# Patient Record
Sex: FEMALE | Race: AMERICAN INDIAN OR ALASKA NATIVE | Employment: UNEMPLOYED | URBAN - NONMETROPOLITAN AREA
[De-identification: names, ages, dates, MRNs, and addresses within clinical notes are randomized per-mention and may not be internally consistent; named-entity substitution may affect disease eponyms.]

---

## 2018-02-19 ENCOUNTER — HOSPITAL ENCOUNTER (EMERGENCY)
Facility: HOSPITAL | Age: 18
Discharge: HOME | End: 2018-02-19
Attending: NURSE PRACTITIONER | Admitting: NURSE PRACTITIONER
Payer: MEDICAID

## 2018-02-19 VITALS
TEMPERATURE: 97.9 F | SYSTOLIC BLOOD PRESSURE: 176 MMHG | RESPIRATION RATE: 16 BRPM | OXYGEN SATURATION: 99 % | HEART RATE: 84 BPM | DIASTOLIC BLOOD PRESSURE: 100 MMHG

## 2018-02-19 DIAGNOSIS — H65.92 LEFT NONSUPPURATIVE OTITIS MEDIA: ICD-10-CM

## 2018-02-19 PROCEDURE — 99213 OFFICE O/P EST LOW 20 MIN: CPT | Performed by: NURSE PRACTITIONER

## 2018-02-19 PROCEDURE — G0463 HOSPITAL OUTPT CLINIC VISIT: HCPCS

## 2018-02-19 RX ORDER — AMOXICILLIN 875 MG
875 TABLET ORAL 2 TIMES DAILY
Qty: 20 TABLET | Refills: 0 | Status: SHIPPED | OUTPATIENT
Start: 2018-02-19 | End: 2018-03-01

## 2018-02-19 ASSESSMENT — ENCOUNTER SYMPTOMS
DYSURIA: 0
CHILLS: 0
VOMITING: 0
FEVER: 0
ABDOMINAL PAIN: 0
ACTIVITY CHANGE: 0
COUGH: 0
WEAKNESS: 0
NAUSEA: 0
APPETITE CHANGE: 0
TROUBLE SWALLOWING: 0
PSYCHIATRIC NEGATIVE: 1
DIARRHEA: 0

## 2018-02-19 NOTE — ED AVS SNAPSHOT
[facility]    [address]    [place] [address]    Phone:  [phone]                                       Heena Del Toro   MRN: [id number]    Department:  [facility]   Date of Visit:  2/19/2018           Patient Information     Date Of Birth          2000        Your diagnoses for this visit were:     Left nonsuppurative otitis media        You were seen by Carmelita Meadows, NP.      Follow-up Information     Follow up with Louis Nich[facility]    Specialty:  Family Practice    Why:  As needed, If symptoms worsen    Contact information:    [facility]  [address]  [place] [address]  [phone]          Follow up with [facility].    Specialty:  EMERGENCY MEDICINE    Why:  As needed, If symptoms worsen    Contact information:    [address]  [place] [address] [phone]    Additional information:    From [place]: Take [address]. Turn left at [address]. Turn left at the first stoplight on [address]. At the first stop sign, take a right onto [address]. Take a left into the parking lot and continue through until you reach the North enterance of the building.       From [place]: Take [address]. Take the [address] ramp towards [place]. Turn left onto [address]. Take a slight right onto [address]. Turn left at the first stoplight on [address]. At the first stop sign, take a right onto [address]. Take a left into the parking lot and continue through until you reach the North enterance of the building.       From [place]: Take [address]. Take a right at [address]. At the first stop sign, take a right onto [address]. Take a left into the parking lot and continue through until you reach the North enterance of the building.         Discharge Instructions       Take antibiotics as ordered.   Eat a yogurt daily while taking  antibiotics.   Take tylenol and/or ibuprofen for pain. Follow dosing on package.   You can take Zyrtec daily for 10 days as this will help the fluid in your ears. Over the counter.   Follow up with PCP with any increase in symptoms or concerns.   Return to urgent care or emergency department with any increase in symptoms or concerns.       Discharge References/Attachments     OTITIS MEDIA (MIDDLE-EAR INFECTION) IN ADULTS (ENGLISH)         Review of your medicines      START taking        Dose / Directions Last dose taken    amoxicillin 875 MG tablet   Commonly known as:  AMOXIL   Dose:  875 mg   Quantity:  20 tablet        Take 1 tablet (875 mg) by mouth 2 times daily for 10 days   Refills:  0          Our records show that you are taking the medicines listed below. If these are incorrect, please call your family doctor or clinic.        Dose / Directions Last dose taken    SYNTHROID PO        Refills:  0                Prescriptions were sent or printed at these locations (1 Prescription)                   [facility]  [address]   [address] [place] [address]    Telephone:  [phone]   Fax:  [phone]   Hours:                  E-Prescribed (1 of 1)         amoxicillin (AMOXIL) 875 MG tablet                Orders Needing Specimen Collection     None      Pending Results     No orders found from 2/17/2018 to 2/20/2018.            Pending Culture Results     No orders found from 2/17/2018 to 2/20/2018.            Thank you for choosing [facility]       Thank you for choosing [facility] for your care. Our goal is always to provide you with excellent care. Hearing back from our patients is one way we can continue to improve our services. Please take a few minutes to complete the written survey that you may receive in the mail after you visit with us. Thank you!        [facility] Information     [facility] lets you send messages to your doctor, view your test results, renew your  "prescriptions, schedule appointments and more. To sign up, go to [email or web address] . Click on \"Log in\" on the left side of the screen, which will take you to the Welcome page. Then click on \"Sign up Now\" on the right side of the page.     You will be asked to enter the access code listed below, as well as some personal information. Please follow the directions to create your username and password.     Your access code is: [email or web address]  Expires: 2018  2:36 PM     Your access code will  in 90 days. If you need help or a new code, please call your [facility] clinic or [phone].        Care EveryWhere ID     This is your Care EveryWhere ID. This could be used by other organizations to access your [facility] medical records  [id number]        Equal Access to Services     KATHERYN ZARAGOZA : Susan Webb, eden morfin, beth handley, alejandro nguyen . So [facility] [phone].    ATENCIÓN: Si habla español, tiene a bowie disposición servicios gratuitos de asistencia lingüística. Llame al [phone].    We comply with applicable federal civil rights laws and [place] laws. We do not discriminate on the basis of race, color, national origin, age, disability, sex, sexual orientation, or gender identity.            After Visit Summary       This is your record. Keep this with you and show to your community pharmacist(s) and doctor(s) at your next visit.                  "

## 2018-02-19 NOTE — ED PROVIDER NOTES
History     Chief Complaint   Patient presents with     Otalgia     Left ear pain starting today. Denies fevers. 2/10 pain.      The history is provided by the patient. No  was used.     Heena Del Toro is a 17 year old female who presents with left ear pain that started today. No interventions for symptoms. Denies fever, chills, or night sweats. Eating and drinking well. Bowel and bladder are working well. No antibiotic use in the past 30 days.       Problem List:    Patient Active Problem List    Diagnosis Date Noted     Dysmenorrhea 03/14/2014     Priority: Medium        Past Medical History:    History reviewed. No pertinent past medical history.    Past Surgical History:    Past Surgical History:   Procedure Laterality Date     LAPAROSCOPIC CHOLECYSTECTOMY  3/24/11     PE TUBES         Family History:    Family History   Problem Relation Age of Onset     DIABETES Maternal Grandmother      Hypertension Maternal Grandmother        Social History:  Marital Status:  Single [1]  Social History   Substance Use Topics     Smoking status: Never Smoker     Smokeless tobacco: Never Used     Alcohol use No        Medications:      Levothyroxine Sodium (SYNTHROID PO)   amoxicillin (AMOXIL) 875 MG tablet         Review of Systems   Constitutional: Negative for activity change, appetite change, chills and fever.   HENT: Positive for ear pain. Negative for congestion, ear discharge, hearing loss and trouble swallowing.         Left ear pain.    Respiratory: Negative for cough.    Gastrointestinal: Negative for abdominal pain, diarrhea, nausea and vomiting.   Genitourinary: Negative for dysuria.   Skin: Negative for rash.   Neurological: Negative for weakness.   Psychiatric/Behavioral: Negative.        Physical Exam   BP: 176/100  Pulse: 84  Temp: 97.9  F (36.6  C)  Resp: 16  SpO2: 99 %      Physical Exam   Constitutional: She is oriented to person, place, and time. She appears well-developed and  well-nourished. No distress.   HENT:   Head: Normocephalic.   Right Ear: External ear normal.   Mouth/Throat: Oropharynx is clear and moist. No oropharyngeal exudate.   Left middle ear with erythema and slight bulge to TM. Bilateral TM's intact. Fluid in left ear.    Neck: Normal range of motion. Neck supple.   Cardiovascular: Normal rate, regular rhythm and normal heart sounds.    No murmur heard.  Pulmonary/Chest: Effort normal. No respiratory distress. She has no wheezes. She has no rales.   Abdominal: Soft. She exhibits no distension.   Musculoskeletal: Normal range of motion.   Lymphadenopathy:     She has no cervical adenopathy.   Neurological: She is alert and oriented to person, place, and time. She exhibits normal muscle tone.   Skin: Skin is warm and dry. No rash noted. She is not diaphoretic.   Psychiatric: She has a normal mood and affect. Her behavior is normal.   Nursing note and vitals reviewed.      ED Course     ED Course     Procedures      Assessments & Plan (with Medical Decision Making)     Discussed plan of care. She verbalized understanding. All questions answered.     I have reviewed the nursing notes.    I have reviewed the findings, diagnosis, plan and need for follow up with the patient.  Discharged in stable condition.      New Prescriptions    AMOXICILLIN (AMOXIL) 875 MG TABLET    Take 1 tablet (875 mg) by mouth 2 times daily for 10 days       Final diagnoses:   Left nonsuppurative otitis media     Take antibiotics as ordered.   Eat a yogurt daily while taking antibiotics.   Take tylenol and/or ibuprofen for pain. Follow dosing on package.   You can take Zyrtec daily for 10 days as this will help the fluid in your ears. Over the counter.   Follow up with PCP with any increase in symptoms or concerns.   Return to urgent care or emergency department with any increase in symptoms or concerns.     VIDHI Núñez  2/19/2018  2:11 PM  [facility]       Carmelita Meadows,  NP  02/19/18 1704

## 2018-02-19 NOTE — ED AVS SNAPSHOT
[facility]    [address] [address]    Phone:  [phone]                                       Heena Del Toro   MRN: [id number]    Department:  [facility]   Date of Visit:  2/19/2018           After Visit Summary Signature Page     I have received my discharge instructions, and my questions have been answered. I have discussed any challenges I see with this plan with the nurse or doctor.    ..........................................................................................................................................  Patient/Patient Representative Signature      ..........................................................................................................................................  Patient Representative Print Name and Relationship to Patient    ..................................................               ................................................  Date                                            Time    ..........................................................................................................................................  Reviewed by Signature/Title    ...................................................              ..............................................  Date                                                            Time

## 2018-02-19 NOTE — DISCHARGE INSTRUCTIONS
Take antibiotics as ordered.   Eat a yogurt daily while taking antibiotics.   Take tylenol and/or ibuprofen for pain. Follow dosing on package.   You can take Zyrtec daily for 10 days as this will help the fluid in your ears. Over the counter.   Follow up with PCP with any increase in symptoms or concerns.   Return to urgent care or emergency department with any increase in symptoms or concerns.

## 2025-04-16 ENCOUNTER — APPOINTMENT (OUTPATIENT)
Dept: GENERAL RADIOLOGY | Facility: HOSPITAL | Age: 25
End: 2025-04-16
Attending: FAMILY MEDICINE
Payer: COMMERCIAL

## 2025-04-16 ENCOUNTER — HOSPITAL ENCOUNTER (EMERGENCY)
Facility: HOSPITAL | Age: 25
Discharge: HOME | End: 2025-04-16
Attending: FAMILY MEDICINE
Payer: COMMERCIAL

## 2025-04-16 VITALS
SYSTOLIC BLOOD PRESSURE: 131 MMHG | TEMPERATURE: 98.1 F | DIASTOLIC BLOOD PRESSURE: 95 MMHG | WEIGHT: 243.5 LBS | OXYGEN SATURATION: 98 % | HEART RATE: 108 BPM | RESPIRATION RATE: 16 BRPM

## 2025-04-16 DIAGNOSIS — K59.04 CHRONIC IDIOPATHIC CONSTIPATION: ICD-10-CM

## 2025-04-16 DIAGNOSIS — R10.9 CHRONIC ABDOMINAL PAIN: ICD-10-CM

## 2025-04-16 DIAGNOSIS — G89.29 CHRONIC ABDOMINAL PAIN: ICD-10-CM

## 2025-04-16 LAB
HCG UR QL: NEGATIVE
HOLD SPECIMEN: NORMAL

## 2025-04-16 PROCEDURE — 74019 RADEX ABDOMEN 2 VIEWS: CPT

## 2025-04-16 PROCEDURE — 74019 RADEX ABDOMEN 2 VIEWS: CPT | Mod: 26 | Performed by: RADIOLOGY

## 2025-04-16 PROCEDURE — 81025 URINE PREGNANCY TEST: CPT | Performed by: FAMILY MEDICINE

## 2025-04-16 PROCEDURE — 99284 EMERGENCY DEPT VISIT MOD MDM: CPT

## 2025-04-16 PROCEDURE — 99283 EMERGENCY DEPT VISIT LOW MDM: CPT | Performed by: FAMILY MEDICINE

## 2025-04-16 RX ORDER — POLYETHYLENE GLYCOL 3350 17 G/17G
1 POWDER, FOR SOLUTION ORAL DAILY
COMMUNITY

## 2025-04-16 RX ORDER — OMEPRAZOLE 40 MG/1
40 CAPSULE, DELAYED RELEASE ORAL
COMMUNITY
Start: 2025-03-10

## 2025-04-16 RX ORDER — SENNOSIDES A AND B 8.6 MG/1
1 TABLET, FILM COATED ORAL EVERY EVENING
COMMUNITY
Start: 2025-02-03

## 2025-04-16 ASSESSMENT — COLUMBIA-SUICIDE SEVERITY RATING SCALE - C-SSRS
1. IN THE PAST MONTH, HAVE YOU WISHED YOU WERE DEAD OR WISHED YOU COULD GO TO SLEEP AND NOT WAKE UP?: NO
6. HAVE YOU EVER DONE ANYTHING, STARTED TO DO ANYTHING, OR PREPARED TO DO ANYTHING TO END YOUR LIFE?: NO
2. HAVE YOU ACTUALLY HAD ANY THOUGHTS OF KILLING YOURSELF IN THE PAST MONTH?: NO

## 2025-04-16 ASSESSMENT — ACTIVITIES OF DAILY LIVING (ADL)
ADLS_ACUITY_SCORE: 41

## 2025-04-16 NOTE — ED TRIAGE NOTES
Pt presents with c/o bad pain and constipation that has been chronic for about a year. Pt states she takes senna and miralax with no relief.

## 2025-04-16 NOTE — ED PROVIDER NOTES
History     Chief Complaint   Patient presents with    Abdominal Pain    Constipation     HPI  Heena Del Toro is a 24 year old female who comes in with her mom in private car with complaint of intermittent abdominal pain and continuing/recurrent constipation/obstipation.  Patient states that she had a bowel movement yesterday but it was small and consisted of hard stool mera.  She feels like she is not getting cleaned out.  She also describes intermittent abdominal pain that is cramping in nature.  Denies vomiting.  She states her appetite is decreased.    Denies diarrhea denies fevers chills or sweats.  Past history is notable for status post lap cholecystectomy, also history of levothyroxine.    Allergies:  No Known Allergies    Problem List:    Patient Active Problem List    Diagnosis Date Noted    Dysmenorrhea 03/14/2014     Priority: Medium        Past Medical History:    No past medical history on file.    Past Surgical History:    Past Surgical History:   Procedure Laterality Date    LAPAROSCOPIC CHOLECYSTECTOMY  3/24/11    PE TUBES         Family History:    Family History   Problem Relation Age of Onset    Diabetes Maternal Grandmother     Hypertension Maternal Grandmother        Social History:  Marital Status:  Single [1]  Social History     Tobacco Use    Smoking status: Never    Smokeless tobacco: Never   Substance Use Topics    Alcohol use: No    Drug use: No        Medications:    Levothyroxine Sodium (SYNTHROID PO)  methylcellulose (CITRUCEL) 500 MG TABS tablet  omeprazole (PRILOSEC) 40 MG DR capsule  polyethylene glycol (MIRALAX) 17 GM/Dose powder  senna (SENOKOT) 8.6 MG tablet          Review of Systems    Physical Exam   BP: (!) 149/87  Pulse: 119  Temp: 99.3  F (37.4  C)  Resp: 16  Weight: 110.5 kg (243 lb 8 oz)  SpO2: 98 %      Physical Exam    ED Course        Procedures           Results for orders placed or performed during the hospital encounter of 04/16/25 (from the past 24 hours)    [facility] Draw    Narrative    The following orders were created for panel order [facility].  Procedure                               Abnormality         Status                     ---------                               -----------         ------                     Extra Urine Collection[[id number]]                          Final result                 Please view results for these tests on the individual orders.   Extra Urine Collection   Result Value Ref Range    Hold Specimen JIC    HCG qualitative urine   Result Value Ref Range    hCG Urine Qualitative Negative Negative   XR Abdomen 2 Views    Narrative    PROCEDURE:  XR ABDOMEN 2 VIEWS    HISTORY:  abd pain in context of chronic constipation, rule out bowel  obstruction.     TECHNIQUE:  Upright and supine views of the abdomen were obtained.    COMPARISON:  8/16/2013    FINDINGS:     There is a nonobstructive bowel gas pattern. No free air is seen. No  abnormal calcifications are evident. There are surgical clips in the  right upper quadrant.      Impression    IMPRESSION:    No obstruction or free air.    ERMA COWAN MD         SYSTEM ID:  [id number]       Medications - No data to display    Assessments & Plan (with Medical Decision Making)     I have reviewed the nursing notes.    I I spent 15 to 20 minutes discussing with the patient and her mom the chronic and recurrent nature of her condition.  I recommended increasing her MiraLAX to hopefully facilitate getting her cleaned out and then resuming her regular regiment.  We discovered that [facility] gastroenterology had contacted her to set up appointment on 3/10/2025 but they did not call back to set this appointment up.  Over the course of the ER visit they did contact GI and have no scheduled appointment there which was the original intent of their primary care provider.  Symptoms are suspicious for a relapsing remitting type of irritable bowel syndrome.  GI consultation can clarify  this    Discharge Medication List as of 4/16/2025  2:55 PM          Final diagnoses:   Chronic idiopathic constipation   Chronic abdominal pain       4/16/2025   [facility]       Juanjose Mattson MD  04/16/25 [id number]

## 2025-04-16 NOTE — DISCHARGE INSTRUCTIONS
Please consider taking additional doses of MiraLAX as we discussed to clean out your system and then resume your regular bowel regimen after you are cleaned out.  Follow-up with gastroenterology department at [facility] as we discussed.  Return here or see primary MD if symptoms are worsening.  Eat a high-fiber high liquid diet with 4-5 servings of fruits and vegetables daily to help alleviate your constipation.

## 2025-04-16 NOTE — ED NOTES
"ED Triage Provider Note  [facility]  Encounter Date: Apr 16, 2025    History:  Chief Complaint   Patient presents with    Abdominal Pain    Constipation     Heena Del Toro is a 24 year old female who presents to the ED with ***. {include 1-3 HPI elements}    Review of Systems:  {1 item required}    Exam:  BP (!) 149/87   Pulse 119   Temp 99.3  F (37.4  C) (Tympanic)   Resp 16   Wt 110.5 kg (243 lb 8 oz)   SpO2 98%   General: No acute distress. Appears stated age.   Cardio: {normal/tachy/luke:363484::\"normal\"} rate, extremities well perfused  Resp: Normal work of breathing, grossly normal respiratory rate  Neuro: Alert. Facial movement grossly symmetric. Grossly intact strength.   {edit exam as needed, may add problem pertinent system}    Medical Decision Making:  Patient arriving to the ED with problem as above. A medical screening exam was performed. {Initial differential:335240::\"Initial differential diagnosis includes but not limited to ***.\"}    *** orders initiated from Triage. The patient is most appropriate to {ED Triage Destination:883080}.       Deysi Pereira, RN  4/16/2025 at 11:56 AM  "

## 2025-05-06 ASSESSMENT — ENCOUNTER SYMPTOMS
NEUROLOGICAL NEGATIVE: 1
PSYCHIATRIC NEGATIVE: 1
HEMATOLOGIC/LYMPHATIC NEGATIVE: 1
CONSTIPATION: 1
CONSTITUTIONAL NEGATIVE: 1
EYES NEGATIVE: 1
MUSCULOSKELETAL NEGATIVE: 1
ALLERGIC/IMMUNOLOGIC NEGATIVE: 1
CARDIOVASCULAR NEGATIVE: 1
ABDOMINAL PAIN: 1
RESPIRATORY NEGATIVE: 1